# Patient Record
Sex: FEMALE | Race: WHITE | NOT HISPANIC OR LATINO | URBAN - METROPOLITAN AREA
[De-identification: names, ages, dates, MRNs, and addresses within clinical notes are randomized per-mention and may not be internally consistent; named-entity substitution may affect disease eponyms.]

---
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
## 2025-01-31 ENCOUNTER — TELEMEDICINE (OUTPATIENT)
Dept: PEDIATRICS | Facility: CLINIC | Age: 20
End: 2025-01-31
Payer: COMMERCIAL

## 2025-01-31 DIAGNOSIS — L24.9 IRRITANT DERMATITIS: ICD-10-CM

## 2025-01-31 DIAGNOSIS — B37.2 CANDIDAL DERMATITIS: Primary | ICD-10-CM

## 2025-01-31 PROCEDURE — 99213 OFFICE O/P EST LOW 20 MIN: CPT | Performed by: PEDIATRICS

## 2025-01-31 RX ORDER — CLOTRIMAZOLE AND BETAMETHASONE DIPROPIONATE 10; .64 MG/G; MG/G
1 CREAM TOPICAL 2 TIMES DAILY
Qty: 45 G | Refills: 0 | Status: SHIPPED | OUTPATIENT
Start: 2025-01-31 | End: 2025-02-14

## 2025-01-31 NOTE — PROGRESS NOTES
Subjective   Patient ID: Hien Arenas is a 19 y.o. female.    Virtual or Telephone Consent    An interactive audio and video telecommunication system which permits real time communications between the patient (at the originating site) and provider (at the distant site) was utilized to provide this telehealth service.   Verbal consent was requested and obtained from Hien Arenas on this date, 01/31/25 for a telehealth visit.  Pt at [employer].    Hien reports taht about a month ago, her armpits started to feel more itchy.  Didn't really think too much of it, R was worse than L.  But then a couple of weeks ago, she also noted this brownish discoloration to the R armpit and the itchiness just isn't going away.  L armpit with small streak of discoloration.    No rash elsewhere.  No new lotions/soaps/detergents.  She did change deodorants but didn't make a difference.  Does have some allergies in general, sensitive skin.      At school at [employer] in dorms with no other systemic sx.  Eating/drinking/acting well otherwise.  Periods are a little off (but stressed!)        Review of Systems    Objective   Physical Exam  Constitutional:       Appearance: Normal appearance.   HENT:      Head: Normocephalic.      Right Ear: External ear normal.      Left Ear: External ear normal.      Nose: Nose normal.      Mouth/Throat:      Mouth: Mucous membranes are moist.   Skin:     Comments: R axillary region with vague brownish/hyperpigmentation discoloration noted (small freckle as well)  L axilla essentially normal appearing, vague line noted of discoloration   Neurological:      Mental Status: She is alert.         Assessment/Plan   Diagnoses and all orders for this visit:  Candidal dermatitis  -     clotrimazole-betamethasone (Lotrisone) cream; Apply 1 Application topically 2 times a day for 14 days.  Irritant dermatitis  Generally well appearing.  Suspect underlying irritatant dermatitis in the axillary area (uncertain  trigger) with now possible candidal superinfection.  Advised trial of Clotrimazole/steroid cream to help with itch and see if improves rash.  Continue to monitor and if persists, would need in person evaluation (limitations of VV noted) to see next steps.  Follow up as needed.

## 2025-03-07 ENCOUNTER — APPOINTMENT (OUTPATIENT)
Dept: PEDIATRICS | Facility: CLINIC | Age: 20
End: 2025-03-07
Payer: COMMERCIAL

## 2025-03-17 ENCOUNTER — APPOINTMENT (OUTPATIENT)
Dept: PEDIATRICS | Facility: CLINIC | Age: 20
End: 2025-03-17
Payer: COMMERCIAL

## 2025-03-17 PROBLEM — D22.5 MELANOCYTIC NEVI OF TRUNK: Status: ACTIVE | Noted: 2023-08-09

## 2025-03-17 NOTE — PROGRESS NOTES
"Subjective   History was provided by the {relatives - child:34414::\"patient\"}.  Hien Arenas is a 19 y.o. female who presents for evaluation of \"allergies\"  Onset of this/these was {0-11:93301} {days/weeks/months:6122} ago  Symptoms include cough {yes/no:51915}  - rhinorrhea/congestion {yes/no:63072}  - ear pain {yes,no:21211}  - fever {Symptoms; fever:28502}  - headache {yes/no:64605}  - sore throat {yes/no:99102}  - rash under arm x   - problems breathing when not coughing {yes/no:28786::\"no\"}  - environmental allergy hx: Yes - hx AR in chart but no hx meds  Associated abdominal symptoms:  {SYMPTOMS; ABDOMINAL w/ URI jtr:23561}    She {Hydration history:39658::\"is drinking plenty of fluids\"}.   Energy level NL:  {yes,no:21211::\"No\"}  Treatment to date: {treatments; uri:27622}      Exposure to COVID {YES/NO:24023::\"No\"}  Exposure to URI {yes/no:28786::\"no\"}  Exposure to Strept {YES/NO:24023::\"No\"}    Objective   There were no vitals taken for this visit.  General: alert, active, in no acute distress  Eyes:  scleral injection {yes,no:21211::\"No\"}  Ears: {Peds Ear Exam:20218::\"TM's normal, external auditory canals are clear \"}  Nose: {Ped Nose Exam:20219::\"clear, no discharge\"}  Throat: {Ped Oropharynx Exam:20220::\"moist mucous membranes without erythema, exudates or petechiae\"}  Neck: {Ped Neck Exam:20221::\"supple\",\"no lymphadenopathy\"}  Lungs: {PE Respiratory; PED:23277::\"good aeration throughout all lung fields\",\"no retractions\",\"no nasal flaring\",\"clear breath sounds bilaterally\"}  Heart: {EXAM; HEART PEDS:48688::\"regular rate and rhythm, normal S1 and S2, no murmur\"}  Abd:  {exam; abd ped:39787::\"soft\",\"nontender\",\"no masses\"}  Skin:      Assessment/Plan   19 y.o. female w/ {diagnoses; uri:5273::\"viral upper respiratory illness\"}  {plan; uri:85878::\"Discussed diagnosis and treatment of URI.\",\"Suggested symptomatic OTC remedies.\",\"Follow up as needed.\"}  Discussed all of this in the context of the care of the " total patient in their medical home with us.

## 2025-03-18 ENCOUNTER — TELEPHONE (OUTPATIENT)
Dept: PEDIATRICS | Facility: CLINIC | Age: 20
End: 2025-03-18
Payer: COMMERCIAL

## 2025-03-18 NOTE — TELEPHONE ENCOUNTER
Patient is calling from school, she cancelled last 2 in person appointments , for her rash under her arms.